# Patient Record
Sex: FEMALE | Race: WHITE | Employment: STUDENT | URBAN - METROPOLITAN AREA
[De-identification: names, ages, dates, MRNs, and addresses within clinical notes are randomized per-mention and may not be internally consistent; named-entity substitution may affect disease eponyms.]

---
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
## 2023-04-17 ENCOUNTER — OFFICE VISIT (OUTPATIENT)
Dept: FAMILY MEDICINE CLINIC | Facility: CLINIC | Age: 18
End: 2023-04-17

## 2023-04-17 VITALS
HEART RATE: 78 BPM | SYSTOLIC BLOOD PRESSURE: 110 MMHG | BODY MASS INDEX: 19.12 KG/M2 | OXYGEN SATURATION: 98 % | WEIGHT: 112 LBS | HEIGHT: 64 IN | DIASTOLIC BLOOD PRESSURE: 60 MMHG | RESPIRATION RATE: 16 BRPM

## 2023-04-17 DIAGNOSIS — L30.8 OTHER ECZEMA: ICD-10-CM

## 2023-04-17 DIAGNOSIS — L84 CALLUS OF HAND: ICD-10-CM

## 2023-04-17 DIAGNOSIS — Z91.018 TREE NUT ALLERGY: ICD-10-CM

## 2023-04-17 DIAGNOSIS — Z00.00 WELL WOMAN EXAM WITHOUT GYNECOLOGICAL EXAM: Primary | ICD-10-CM

## 2023-04-17 PROCEDURE — 99395 PREV VISIT EST AGE 18-39: CPT | Performed by: FAMILY MEDICINE

## 2023-04-17 RX ORDER — DULOXETIN HYDROCHLORIDE 20 MG/1
40 CAPSULE, DELAYED RELEASE ORAL EVERY MORNING
COMMUNITY
Start: 2023-03-28

## 2023-04-17 RX ORDER — EPINEPHRINE 0.3 MG/.3ML
0.3 INJECTION SUBCUTANEOUS ONCE
Qty: 1 EACH | Refills: 0 | Status: SHIPPED | OUTPATIENT
Start: 2023-04-17 | End: 2023-04-17

## 2023-04-17 RX ORDER — METHYLPHENIDATE 25.9 MG/1
TABLET, ORALLY DISINTEGRATING ORAL
COMMUNITY
Start: 2023-03-10

## 2024-04-18 ENCOUNTER — OFFICE VISIT (OUTPATIENT)
Dept: FAMILY MEDICINE CLINIC | Facility: CLINIC | Age: 19
End: 2024-04-18
Payer: COMMERCIAL

## 2024-04-18 VITALS
RESPIRATION RATE: 16 BRPM | HEART RATE: 90 BPM | DIASTOLIC BLOOD PRESSURE: 62 MMHG | HEIGHT: 64 IN | BODY MASS INDEX: 21 KG/M2 | WEIGHT: 123 LBS | SYSTOLIC BLOOD PRESSURE: 118 MMHG | TEMPERATURE: 97 F

## 2024-04-18 DIAGNOSIS — Z23 NEED FOR VACCINATION: ICD-10-CM

## 2024-04-18 DIAGNOSIS — Z00.00 WELL WOMAN EXAM WITHOUT GYNECOLOGICAL EXAM: Primary | ICD-10-CM

## 2024-04-18 DIAGNOSIS — R05.9 COUGH IN ADULT: ICD-10-CM

## 2024-04-18 LAB
COVID19 BINAX NOW ANTIGEN: NOT DETECTED
OPERATOR ID: ABNORMAL

## 2024-04-18 PROCEDURE — 90715 TDAP VACCINE 7 YRS/> IM: CPT | Performed by: FAMILY MEDICINE

## 2024-04-18 PROCEDURE — 96127 BRIEF EMOTIONAL/BEHAV ASSMT: CPT | Performed by: FAMILY MEDICINE

## 2024-04-18 PROCEDURE — 99395 PREV VISIT EST AGE 18-39: CPT | Performed by: FAMILY MEDICINE

## 2024-04-18 PROCEDURE — 90471 IMMUNIZATION ADMIN: CPT | Performed by: FAMILY MEDICINE

## 2024-04-18 NOTE — PROGRESS NOTES
SUBJECTIVE:  Chief Complaint   Patient presents with    Physical     WWE/ no pap  Congested, cough x 3 days      HPI:  2-3 days of cold symptoms. Cough, congestion, hoarseness. Started robitussin last night. This has helped some. Using emergen-C.     Is seeing psychiatry. Stopped duloextine. Thinks this is going well. On cotempla XR. Symptoms stable.      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 Was previously seeing endo.  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  Has B/L lobster claw deformities    Health Maintenance:  Vaccines: reviewed as below. Indicated today: Tdap  Immunization History   Administered Date(s) Administered    Covid-19 Vaccine Pfizer 30 mcg/0.3 ml 04/20/2021, 05/11/2021    Hpv Virus Vaccine 9 Jil Im 08/11/2021    TDAP 04/18/2024     Obesity screening: Body mass index is 21.11 kg/m².  Diabetes screening:  due  Hypercholesterolemia screening: due    Depression screen:   Depression Screening (PHQ-2/PHQ-9): Over the LAST 2 WEEKS   Little interest or pleasure in doing things: Not at all    Feeling down, depressed, or hopeless: Several days    PHQ-2 SCORE: 1      Osteoporosis: No history of pathologic fractures. No steroid use, smoking, risk of falls, excessive alcohol use.  Cervical Cancer screening: n/a  Colon Cancer screening: Family history of colon cancer?  no  Breast Cancer screening: Family history of breast cancer? no   STI: Desires testing for STIs? no  Tobacco use:  reports that she has never smoked. She has never used smokeless tobacco.    ROS: Negative unless stated above    HISTORY:  Past Medical History:    Claw hand, lobster, congenital, bilateral    Conductive hearing loss of both ears    Sncqd-Efiwffovho-Jzmmhf-Benigno syndrome (HCC)      Past Surgical History:   Procedure Laterality Date    Adenoidectomy      Create eardrum opening,gen anesth      Hand/finger surgery unlisted Bilateral      Tonsillectomy        Family History   Problem Relation Age of Onset    Anxiety Father     Depression Father     Heart Disorder Paternal Grandfather       Social History     Socioeconomic History    Marital status: Single   Tobacco Use    Smoking status: Never    Smokeless tobacco: Never   Vaping Use    Vaping status: Former    Substances: Nicotine, THC    Devices: Disposable, Pre-filled or refillable cartridge   Substance and Sexual Activity    Alcohol use: Not Currently     Comment: Last use was January 2022, patient reports drink \"anything\", mostly Vodka     Drug use: Not Currently     Types: Cannabis     Comment: Cannabis last use was Feb/March 2022    Other Topics Concern    Caffeine Concern Yes     Comment: 3 cups of coffee daily    Exercise Yes     Comment: 1-2x a week        Allergies:  Allergies   Allergen Reactions    Tree Nuts ANAPHYLAXIS    Cat Hair Extract OTHER (SEE COMMENTS)     Sneezing     Nuts SWELLING       OBJECTIVE:  PHYSICAL EXAM:  Vitals:    04/18/24 0844   BP: 118/62   Pulse: 90   Resp: 16   Temp: 97.1 °F (36.2 °C)   Weight: 123 lb (55.8 kg)   Height: 5' 4\" (1.626 m)     Physical Examination: General appearance - alert, well appearing, and in no distress and normal appearing weight  Mental status - alert, oriented to person, place, and time, normal mood, behavior, speech, dress, motor activity, and thought processes  Ears - bilateral TM's and external ear canals normal  Neck - supple, no significant adenopathy  Chest - clear to auscultation, no wheezes, rales or rhonchi, symmetric air entry  Heart - normal rate, regular rhythm, normal S1, S2, no murmurs, rubs, clicks or gallops  Abdomen - soft, nontender, nondistended, no masses or organomegaly  Breasts - breasts appear normal, no suspicious masses, no skin or nipple changes or axillary nodes  Extremities - peripheral pulses normal, no pedal edema, no clubbing or cyanosis    ASSESSMENT & PLAN:  Audrey E Wachter is a 19 year old female is here  for Physical (WWE/ no pap/Congested, cough x 3 days )    Problem List Items Addressed This Visit    None  Visit Diagnoses       Well woman exam without gynecological exam    -  Primary    Need for vaccination        Relevant Orders    Immunization Admin Counseling, 1st Component, 18 years and older    TdaP (Boostrix/Adacel) Vaccine (> 7 Y) (Completed)    Cough in adult        Relevant Orders    COVID19 BinaxNOW Antigen (Completed)            Aminta was seen today for physical.    Diagnoses and all orders for this visit:    Well woman exam without gynecological exam  Routine health maintenance reviewed, discussed and updated as below. This includes: vaccines. Healthy diet and exercise reviewed.     Need for vaccination  -     Immunization Admin Counseling, 1st Component, 18 years and older  -     TdaP (Boostrix/Adacel) Vaccine (> 7 Y)    Cough in adult  -     COVID19 BinaxNOW Antigen- negative.  Discussed symptomatic management.      Call or return to clinic prn if these symptoms worsen or fail to improve as anticipated. RTC in 1 year.   Michelle Ackerman DO  4/18/2024 9:00 AM    Note to Patient  The 21st Century Cures Act makes medical notes like these available to patients in the interest of transparency. However, be advised this is a medical document and is intended as aeaj-ui-cink communication; it is written in medical language and may appear blunt, direct, or contain abbreviations or verbiage that are unfamiliar. Medical documents are intended to carry relevant information, facts as evident, and the clinical opinion of the practitioner.     This report has been produced using speech recognition software, and may contain errors related to grammar, punctuation, spelling, words or phrases unrecognized or not translated appropriately to text; these errors may be referred to the dictating provider for further clarification and/or addendum as needed.

## 2025-04-18 NOTE — PROGRESS NOTES
SUBJECTIVE:  Chief Complaint   Patient presents with    Physical     Women's wellness annual examination     HPI:  History of Present Illness  Audrey E Wachter is a 20 year old female who presents for an annual physical exam.    Last year, after receiving a COVID test and a tetanus shot, she experienced severe symptoms including pain, vomiting, diarrhea, and difficulty inhaling while driving to see her girlfriend. She was diagnosed with pneumonia and bronchitis at urgent care. She had a cold a few months ago but did not experience a recurrence of severe symptoms.    She has a history of MRKH syndrome, with one ovary identified on the left side via MRI. She experiences monthly alternating side pain, but it does not last long. She last saw an endocrinologist about four to five years ago. She is curious about her hormone levels and the potential for early menopause due to her condition.    She has been experiencing diarrhea recently and inquires about dietary changes to firm up her stools.    She is currently managed by psychiatry for her medications and has been prescribed cyproheptadine to help with appetite issues caused by her current medication. She has not yet started taking it but plans to do so.    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 Was previously seeing endo.  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  Has B/L lobster claw deformities    Health Maintenance:  Vaccines: reviewed as below. Indicated today: HPV  Immunization History   Administered Date(s) Administered    Covid-19 Vaccine Pfizer 30 mcg/0.3 ml 04/20/2021, 05/11/2021    Hpv Virus Vaccine 9 Jil Im 08/11/2021, 04/18/2025    TDAP 04/18/2024     Obesity screening: Body mass index is 19.7 kg/m².  Diabetes screening:  No results found for: \"EAG\", \"A1C\"   Hypercholesterolemia screening:   No results found for: \"HDL\"  No results found for: \"LDL\"  No results  found for: \"TRIG\"     Depression screen: no concerns  Osteoporosis: No history of pathologic fractures. No steroid use, smoking, risk of falls, excessive alcohol use.  Cervical Cancer screening: Last Pap: n/a  Colon Cancer screening: Family history of colon cancer? no Last colonoscopy: -   Breast Cancer screening: Family history of breast cancer? no Last mammogram: -   STI: Desires testing for STIs? no  Tobacco use:  reports that she has never smoked. She has never used smokeless tobacco.    ROS: Negative unless stated above  k  HISTORY:  Past Medical History[1]   Past Surgical History[2]   Family History[3]   Short Social Hx on File[4]     Allergies:  Allergies[5]    OBJECTIVE:  PHYSICAL EXAM:  Vitals:    04/18/25 0922   BP: 104/62   BP Location: Left arm   Pulse: 76   Resp: 16   Temp: 98.1 °F (36.7 °C)   TempSrc: Oral   SpO2: 98%   Weight: 113 lb 6.4 oz (51.4 kg)   Height: 5' 3.62\" (1.616 m)     Physical Examination: General appearance - alert, well appearing, and in no distress and normal appearing weight  Mental status - alert, oriented to person, place, and time, normal mood, behavior, speech, dress, motor activity, and thought processes  Ears - bilateral TM's and external ear canals normal  Neck - supple, no significant adenopathy  Chest - clear to auscultation, no wheezes, rales or rhonchi, symmetric air entry  Heart - normal rate, regular rhythm, normal S1, S2, no murmurs, rubs, clicks or gallops  Abdomen - soft, nontender, nondistended, no masses or organomegaly  Extremities - peripheral pulses normal, no pedal edema, no clubbing or cyanosis    Results  RADIOLOGY  Chest X-ray: Pneumonia and bronchitis  Pelvic ultrasound (2019): No ovaries or uterus visualized  Pelvic MRI: Left ovary visualized    ASSESSMENT & PLAN:  Audrey E Wachter is a 20 year old female is here for Physical (Women's wellness annual examination)      1. Routine health maintenance (Primary)  -     CBC With Differential With Platelet;  Future; Expected date: 04/18/2025  -     Comp Metabolic Panel (14); Future; Expected date: 04/18/2025  -     Lipid Panel; Future; Expected date: 04/18/2025  -     TSH W Reflex To Free T4; Future; Expected date: 04/18/2025  -     Hemoglobin A1C; Future; Expected date: 04/18/2025  -     Estradiol; Future; Expected date: 04/18/2025  -     FSH AND LH; Future; Expected date: 04/18/2025  -     Testosterone Total; Future; Expected date: 04/18/2025  2. Need for vaccination  -     Immunization Admin Counseling, 1st Component, 18 years and older  -     Human Papillomavirus 9-valent vaccine, Recombinant (Gardasil 9) HPV 9 [45472]  3. Pelvic pain  -     MRI PELVIS (SOFT TISSUE) (CPT=72195); Future; Expected date: 04/18/2025  4. Uterine agenesis  -     MRI PELVIS (SOFT TISSUE) (CPT=72195); Future; Expected date: 04/18/2025  5. Uvhxo-Jxuugtlmhg-Mskkly-Benigno syndrome    Assessment & Plan  Vxajv-Wwulpyvwmk-Encqfu-Benigno syndrome (MRKH)  MRKH syndrome with one ovary identified on MRI. Experiences monthly alternating side pain, possibly related to ovarian function. Previous endocrinology evaluation indicated signs of estrogen exposure and a small ovary. Clinical presentation fits MRKH, though no chromosomal or genetic testing confirmed. No desire for vaginal formation interventions. Potential for early menopause due to smaller ovary size.  - Order pelvic MRI to evaluate for ovarian cysts or other abnormalities.  - Order hormone level tests including estrogen, FSH, LH, and prolactin.  - Consider referral to adult endocrinologist if needed.    Hx Pneumonia and bronchitis  Experienced severe symptoms including pain, vomiting, diarrhea, and difficulty inhaling during a previous episode. Concern about recurrence, but no increased risk identified. No asthma symptoms. Previous COVID test was negative.    Diarrhea  Recent episodes of diarrhea with no specific cause identified. Discussed dietary modifications to help firm stools.  -  Recommend BRAT diet (bananas, rice, apples, toast, and tea) to help firm stools.    General Health Maintenance  Completion of HPV vaccination series discussed. Risk of HPV exposure is low, but potential benefit in preventing head and neck cancers. No cervix due to MRKH, so no risk of cervical cancer. Discussed potential soreness from the vaccine, less severe than tetanus vaccine.  - Administer HPV vaccine to complete the series.  - Order routine labs including cholesterol and blood counts.    Call or return to clinic prn if these symptoms worsen or fail to improve as anticipated. RTC in 1 year.   Michelle Ackerman DO  4/18/2025 9:39 AM    Note to Patient  The 21st Century Cures Act makes medical notes like these available to patients in the interest of transparency. However, be advised this is a medical document and is intended as ikci-na-faqi communication; it is written in medical language and may appear blunt, direct, or contain abbreviations or verbiage that are unfamiliar. Medical documents are intended to carry relevant information, facts as evident, and the clinical opinion of the practitioner.     This report has been produced using speech recognition software and AI generated text, and may contain errors related to grammar, punctuation, spelling, words or phrases unrecognized or not translated appropriately to text; these errors may be referred to the dictating provider for further clarification and/or addendum as needed.         [1]   Past Medical History:   Claw hand, lobster, congenital, bilateral    Conductive hearing loss of both ears    Hzacc-Hxgfeyvntn-Digfyh-S[place]lehouse syndrome   [2]   Past Surgical History:  Procedure Laterality Date    Adenoidectomy      Create eardrum opening,gen anesth      Hand/finger surgery unlisted Bilateral     Tonsillectomy     [3]   Family History  Problem Relation Age of Onset    Anxiety Father     Depression Father     Heart Disorder Paternal Grandfather    [4]   Social  History  Socioeconomic History    Marital status: Single   Tobacco Use    Smoking status: Never    Smokeless tobacco: Never   Vaping Use    Vaping status: Former    Substances: Nicotine, THC    Devices: Disposable, Pre-filled or refillable cartridge   Substance and Sexual Activity    Alcohol use: Yes     Comment: [facility]    Drug use: Not Currently     Types: Cannabis     Comment: Cannabis last use was Feb/March 2022    Other Topics Concern    Caffeine Concern Yes     Comment: 3 cups of coffee daily    Exercise Yes     Comment: 1-2x a week   [5]   Allergies  Allergen Reactions    Tree Nuts ANAPHYLAXIS    Cat Hair Extract OTHER (SEE COMMENTS)     Sneezing     Nuts SWELLING

## 2025-04-18 NOTE — PROGRESS NOTES
The following individual(s) verbally consented to be recorded using ambient AI listening technology and understand that they can each withdraw their consent to this listening technology at any point by asking the clinician to turn off or pause the recording:    Patient name: Aminta E Wachter